# Patient Record
Sex: MALE
[De-identification: names, ages, dates, MRNs, and addresses within clinical notes are randomized per-mention and may not be internally consistent; named-entity substitution may affect disease eponyms.]

---

## 2017-09-13 ENCOUNTER — APPOINTMENT (OUTPATIENT)
Dept: INTERNAL MEDICINE | Facility: CLINIC | Age: 47
End: 2017-09-13
Payer: COMMERCIAL

## 2017-09-13 VITALS
BODY MASS INDEX: 25.92 KG/M2 | HEIGHT: 69 IN | DIASTOLIC BLOOD PRESSURE: 96 MMHG | OXYGEN SATURATION: 99 % | WEIGHT: 175 LBS | SYSTOLIC BLOOD PRESSURE: 141 MMHG | TEMPERATURE: 98.4 F | HEART RATE: 66 BPM

## 2017-09-13 DIAGNOSIS — H04.129 DRY EYE SYNDROME OF UNSPECIFIED LACRIMAL GLAND: ICD-10-CM

## 2017-09-13 DIAGNOSIS — Z78.9 OTHER SPECIFIED HEALTH STATUS: ICD-10-CM

## 2017-09-13 PROCEDURE — 99213 OFFICE O/P EST LOW 20 MIN: CPT

## 2017-09-13 PROCEDURE — 99396 PREV VISIT EST AGE 40-64: CPT

## 2017-10-05 LAB
ALBUMIN SERPL ELPH-MCNC: 4.4 G/DL
ALP BLD-CCNC: 77 U/L
ALT SERPL-CCNC: 23 U/L
ANA SER IF-ACNC: NEGATIVE
ANION GAP SERPL CALC-SCNC: 17 MMOL/L
AST SERPL-CCNC: 27 U/L
B BURGDOR AB SER-IMP: NEGATIVE
B BURGDOR IGM PATRN SER IB-IMP: NEGATIVE
B BURGDOR18/20KD IGM SER QL IB: NORMAL
B BURGDOR18KD IGG SER QL IB: NORMAL
B BURGDOR23KD IGG SER QL IB: NORMAL
B BURGDOR23KD IGM SER QL IB: NORMAL
B BURGDOR28KD AB SER QL IB: NORMAL
B BURGDOR28KD IGG SER QL IB: NORMAL
B BURGDOR30KD AB SER QL IB: NORMAL
B BURGDOR30KD IGG SER QL IB: NORMAL
B BURGDOR31KD IGG SER QL IB: NORMAL
B BURGDOR31KD IGM SER QL IB: NORMAL
B BURGDOR39KD IGG SER QL IB: PRESENT
B BURGDOR39KD IGM SER QL IB: NORMAL
B BURGDOR41KD IGG SER QL IB: PRESENT
B BURGDOR41KD IGM SER QL IB: NORMAL
B BURGDOR45KD AB SER QL IB: NORMAL
B BURGDOR45KD IGG SER QL IB: NORMAL
B BURGDOR58KD AB SER QL IB: NORMAL
B BURGDOR58KD IGG SER QL IB: NORMAL
B BURGDOR66KD IGG SER QL IB: NORMAL
B BURGDOR66KD IGM SER QL IB: NORMAL
B BURGDOR93KD IGG SER QL IB: NORMAL
B BURGDOR93KD IGM SER QL IB: NORMAL
BASOPHILS # BLD AUTO: 0.03 K/UL
BASOPHILS NFR BLD AUTO: 0.6 %
BILIRUB SERPL-MCNC: 0.4 MG/DL
BUN SERPL-MCNC: 12 MG/DL
CALCIUM SERPL-MCNC: 9.5 MG/DL
CHLORIDE SERPL-SCNC: 96 MMOL/L
CHOLEST SERPL-MCNC: 189 MG/DL
CHOLEST/HDLC SERPL: 2.6 RATIO
CO2 SERPL-SCNC: 22 MMOL/L
CREAT SERPL-MCNC: 0.86 MG/DL
CRP SERPL-MCNC: <0.2 MG/DL
ENA SS-A AB SER IA-ACNC: <0.2 AL
ENA SS-B AB SER IA-ACNC: <0.2 AL
EOSINOPHIL # BLD AUTO: 0.04 K/UL
EOSINOPHIL NFR BLD AUTO: 0.9 %
ERYTHROCYTE [SEDIMENTATION RATE] IN BLOOD BY WESTERGREN METHOD: 2 MM/HR
GLUCOSE SERPL-MCNC: 82 MG/DL
HCT VFR BLD CALC: 40.6 %
HDLC SERPL-MCNC: 72 MG/DL
HGB BLD-MCNC: 13.7 G/DL
IMM GRANULOCYTES NFR BLD AUTO: 0 %
LDLC SERPL CALC-MCNC: 106 MG/DL
LYMPHOCYTES # BLD AUTO: 1.64 K/UL
LYMPHOCYTES NFR BLD AUTO: 35.2 %
MAN DIFF?: NORMAL
MCHC RBC-ENTMCNC: 30.7 PG
MCHC RBC-ENTMCNC: 33.7 GM/DL
MCV RBC AUTO: 91 FL
MONOCYTES # BLD AUTO: 0.53 K/UL
MONOCYTES NFR BLD AUTO: 11.4 %
NEUTROPHILS # BLD AUTO: 2.42 K/UL
NEUTROPHILS NFR BLD AUTO: 51.9 %
PLATELET # BLD AUTO: 328 K/UL
POTASSIUM SERPL-SCNC: 4 MMOL/L
PROT SERPL-MCNC: 7.6 G/DL
RBC # BLD: 4.46 M/UL
RBC # FLD: 13 %
RHEUMATOID FACT SER QL: <7 IU/ML
SODIUM SERPL-SCNC: 135 MMOL/L
TRIGL SERPL-MCNC: 54 MG/DL
TSH SERPL-ACNC: 1.9 UIU/ML
WBC # FLD AUTO: 4.66 K/UL

## 2018-03-05 ENCOUNTER — MEDICATION RENEWAL (OUTPATIENT)
Age: 48
End: 2018-03-05

## 2018-06-20 ENCOUNTER — APPOINTMENT (OUTPATIENT)
Dept: INTERNAL MEDICINE | Facility: CLINIC | Age: 48
End: 2018-06-20
Payer: COMMERCIAL

## 2018-06-20 VITALS
WEIGHT: 171 LBS | HEIGHT: 69 IN | TEMPERATURE: 98.2 F | SYSTOLIC BLOOD PRESSURE: 146 MMHG | DIASTOLIC BLOOD PRESSURE: 95 MMHG | OXYGEN SATURATION: 98 % | BODY MASS INDEX: 25.33 KG/M2 | HEART RATE: 67 BPM

## 2018-06-20 DIAGNOSIS — J30.9 ALLERGIC RHINITIS, UNSPECIFIED: ICD-10-CM

## 2018-06-20 PROCEDURE — 99396 PREV VISIT EST AGE 40-64: CPT | Mod: 25

## 2018-06-20 PROCEDURE — 36415 COLL VENOUS BLD VENIPUNCTURE: CPT

## 2018-06-27 LAB
ALBUMIN SERPL ELPH-MCNC: 4.4 G/DL
ALP BLD-CCNC: 69 U/L
ALT SERPL-CCNC: 18 U/L
ANION GAP SERPL CALC-SCNC: 16 MMOL/L
AST SERPL-CCNC: 24 U/L
BASOPHILS # BLD AUTO: 0.03 K/UL
BASOPHILS NFR BLD AUTO: 0.7 %
BILIRUB SERPL-MCNC: 0.6 MG/DL
BUN SERPL-MCNC: 13 MG/DL
CALCIUM SERPL-MCNC: 9.3 MG/DL
CHLORIDE SERPL-SCNC: 95 MMOL/L
CO2 SERPL-SCNC: 23 MMOL/L
CREAT SERPL-MCNC: 0.85 MG/DL
EOSINOPHIL # BLD AUTO: 0.04 K/UL
EOSINOPHIL NFR BLD AUTO: 0.9 %
GLUCOSE SERPL-MCNC: 69 MG/DL
HCT VFR BLD CALC: 43.1 %
HGB BLD-MCNC: 14.2 G/DL
IMM GRANULOCYTES NFR BLD AUTO: 0.2 %
LYMPHOCYTES # BLD AUTO: 1.58 K/UL
LYMPHOCYTES NFR BLD AUTO: 37.1 %
MAN DIFF?: NORMAL
MCHC RBC-ENTMCNC: 30.7 PG
MCHC RBC-ENTMCNC: 32.9 GM/DL
MCV RBC AUTO: 93.1 FL
MONOCYTES # BLD AUTO: 0.35 K/UL
MONOCYTES NFR BLD AUTO: 8.2 %
NEUTROPHILS # BLD AUTO: 2.25 K/UL
NEUTROPHILS NFR BLD AUTO: 52.9 %
PLATELET # BLD AUTO: 316 K/UL
POTASSIUM SERPL-SCNC: 4.1 MMOL/L
PROT SERPL-MCNC: 7.4 G/DL
PSA SERPL-MCNC: 0.34 NG/ML
RBC # BLD: 4.63 M/UL
RBC # FLD: 13.3 %
SODIUM SERPL-SCNC: 134 MMOL/L
TSH SERPL-ACNC: 1.99 UIU/ML
WBC # FLD AUTO: 4.26 K/UL

## 2018-07-09 NOTE — HEALTH RISK ASSESSMENT
[Very Good] : ~his/her~  mood as very good [0] : 2) Feeling down, depressed, or hopeless: Not at all (0) [Patient reported colonoscopy was normal] : Patient reported colonoscopy was normal [] : No [[id number]] : 0 [ColonoscopyDate] : 06/18

## 2018-07-09 NOTE — HISTORY OF PRESENT ILLNESS
[FreeTextEntry1] : Annual [de-identified] : Feels fatigued.  Decreased exercising due to work.  Feels like lost 5-10 lbs.  No nausea, vomiting, diarrhea, and constipation. No chest pain or shortness of breath.\par

## 2018-08-30 ENCOUNTER — RX RENEWAL (OUTPATIENT)
Age: 48
End: 2018-08-30

## 2019-02-14 ENCOUNTER — TRANSCRIPTION ENCOUNTER (OUTPATIENT)
Age: 49
End: 2019-02-14

## 2019-04-08 ENCOUNTER — RX RENEWAL (OUTPATIENT)
Age: 49
End: 2019-04-08

## 2019-08-13 ENCOUNTER — NON-APPOINTMENT (OUTPATIENT)
Age: 49
End: 2019-08-13

## 2019-08-13 ENCOUNTER — APPOINTMENT (OUTPATIENT)
Dept: INTERNAL MEDICINE | Facility: CLINIC | Age: 49
End: 2019-08-13
Payer: COMMERCIAL

## 2019-08-13 VITALS
HEIGHT: 70 IN | TEMPERATURE: 98 F | DIASTOLIC BLOOD PRESSURE: 83 MMHG | SYSTOLIC BLOOD PRESSURE: 135 MMHG | HEART RATE: 60 BPM | WEIGHT: 167 LBS | OXYGEN SATURATION: 98 % | RESPIRATION RATE: 14 BRPM | BODY MASS INDEX: 23.91 KG/M2

## 2019-08-13 DIAGNOSIS — M25.551 PAIN IN RIGHT HIP: ICD-10-CM

## 2019-08-13 DIAGNOSIS — R63.4 ABNORMAL WEIGHT LOSS: ICD-10-CM

## 2019-08-13 DIAGNOSIS — M25.552 PAIN IN RIGHT HIP: ICD-10-CM

## 2019-08-13 DIAGNOSIS — R00.2 PALPITATIONS: ICD-10-CM

## 2019-08-13 PROCEDURE — 36415 COLL VENOUS BLD VENIPUNCTURE: CPT

## 2019-08-13 PROCEDURE — 93000 ELECTROCARDIOGRAM COMPLETE: CPT

## 2019-08-13 PROCEDURE — 99396 PREV VISIT EST AGE 40-64: CPT | Mod: 25

## 2019-08-13 NOTE — PHYSICAL EXAM
[No Acute Distress] : no acute distress [Well Nourished] : well nourished [Well Developed] : well developed [Well-Appearing] : well-appearing [Normal Sclera/Conjunctiva] : normal sclera/conjunctiva [PERRL] : pupils equal round and reactive to light [EOMI] : extraocular movements intact [Normal Outer Ear/Nose] : the outer ears and nose were normal in appearance [Normal Oropharynx] : the oropharynx was normal [No JVD] : no jugular venous distention [No Lymphadenopathy] : no lymphadenopathy [Supple] : supple [Thyroid Normal, No Nodules] : the thyroid was normal and there were no nodules present [No Respiratory Distress] : no respiratory distress  [No Accessory Muscle Use] : no accessory muscle use [Clear to Auscultation] : lungs were clear to auscultation bilaterally [Normal Rate] : normal rate  [Regular Rhythm] : with a regular rhythm [Normal S1, S2] : normal S1 and S2 [No Murmur] : no murmur heard [No Carotid Bruits] : no carotid bruits [No Abdominal Bruit] : a ~M bruit was not heard ~T in the abdomen [No Varicosities] : no varicosities [Pedal Pulses Present] : the pedal pulses are present [No Edema] : there was no peripheral edema [No Palpable Aorta] : no palpable aorta [No Extremity Clubbing/Cyanosis] : no extremity clubbing/cyanosis [Soft] : abdomen soft [Non Tender] : non-tender [No Masses] : no abdominal mass palpated [Non-distended] : non-distended [No HSM] : no HSM [Normal Posterior Cervical Nodes] : no posterior cervical lymphadenopathy [Normal Bowel Sounds] : normal bowel sounds [Normal Anterior Cervical Nodes] : no anterior cervical lymphadenopathy [No Spinal Tenderness] : no spinal tenderness [No CVA Tenderness] : no CVA  tenderness [No Joint Swelling] : no joint swelling [No Rash] : no rash [Grossly Normal Strength/Tone] : grossly normal strength/tone [Coordination Grossly Intact] : coordination grossly intact [No Focal Deficits] : no focal deficits [Deep Tendon Reflexes (DTR)] : deep tendon reflexes were 2+ and symmetric [Normal Gait] : normal gait [Normal Affect] : the affect was normal [Normal Insight/Judgement] : insight and judgment were intact

## 2019-08-13 NOTE — HISTORY OF PRESENT ILLNESS
[de-identified] : Very active.  Just hiked 150 miles.  Lost about 4 lbs this year.  No nausea, vomiting, diarrhea, and constipation. No chest pain or shortness of breath.\par

## 2019-08-13 NOTE — REVIEW OF SYSTEMS
[Recent Change In Weight] : ~T recent weight change [Negative] : Heme/Lymph [Night Sweats] : no night sweats [Fever] : no fever

## 2019-08-13 NOTE — HEALTH RISK ASSESSMENT
[Very Good] : ~his/her~  mood as very good [0] : 1) Little interest or pleasure doing things: Not at all (0) [[id number]] : 0 [None] : None [Employed] : employed [Alone] : lives alone [Significant Other] : lives with significant other

## 2019-08-27 LAB
ALBUMIN SERPL ELPH-MCNC: 4.7 G/DL
ALP BLD-CCNC: 74 U/L
ALT SERPL-CCNC: 21 U/L
ANA SER IF-ACNC: NEGATIVE
ANION GAP SERPL CALC-SCNC: 12 MMOL/L
AST SERPL-CCNC: 24 U/L
BASOPHILS # BLD AUTO: 0.03 K/UL
BASOPHILS NFR BLD AUTO: 0.7 %
BILIRUB SERPL-MCNC: 0.6 MG/DL
BUN SERPL-MCNC: 10 MG/DL
CALCIUM SERPL-MCNC: 9.4 MG/DL
CHLORIDE SERPL-SCNC: 96 MMOL/L
CHOLEST SERPL-MCNC: 216 MG/DL
CHOLEST/HDLC SERPL: 2.7 RATIO
CO2 SERPL-SCNC: 25 MMOL/L
CREAT SERPL-MCNC: 0.72 MG/DL
EOSINOPHIL # BLD AUTO: 0.06 K/UL
EOSINOPHIL NFR BLD AUTO: 1.3 %
ERYTHROCYTE [SEDIMENTATION RATE] IN BLOOD BY WESTERGREN METHOD: 2 MM/HR
GLUCOSE SERPL-MCNC: 97 MG/DL
HCT VFR BLD CALC: 44.2 %
HDLC SERPL-MCNC: 80 MG/DL
HGB BLD-MCNC: 14.2 G/DL
IMM GRANULOCYTES NFR BLD AUTO: 0.2 %
LDLC SERPL CALC-MCNC: 124 MG/DL
LYMPHOCYTES # BLD AUTO: 0.99 K/UL
LYMPHOCYTES NFR BLD AUTO: 21.5 %
MAN DIFF?: NORMAL
MCHC RBC-ENTMCNC: 31.4 PG
MCHC RBC-ENTMCNC: 32.1 GM/DL
MCV RBC AUTO: 97.8 FL
MONOCYTES # BLD AUTO: 0.42 K/UL
MONOCYTES NFR BLD AUTO: 9.1 %
NEUTROPHILS # BLD AUTO: 3.1 K/UL
NEUTROPHILS NFR BLD AUTO: 67.2 %
PLATELET # BLD AUTO: 323 K/UL
POTASSIUM SERPL-SCNC: 4.3 MMOL/L
PROT SERPL-MCNC: 6.9 G/DL
PSA SERPL-MCNC: 0.34 NG/ML
RBC # BLD: 4.52 M/UL
RBC # FLD: 13.5 %
RHEUMATOID FACT SER QL: <10 IU/ML
SODIUM SERPL-SCNC: 133 MMOL/L
TRIGL SERPL-MCNC: 60 MG/DL
TSH SERPL-ACNC: 1.71 UIU/ML
WBC # FLD AUTO: 4.61 K/UL

## 2020-03-31 ENCOUNTER — RX RENEWAL (OUTPATIENT)
Age: 50
End: 2020-03-31

## 2020-06-25 ENCOUNTER — RX RENEWAL (OUTPATIENT)
Age: 50
End: 2020-06-25

## 2020-08-17 ENCOUNTER — APPOINTMENT (OUTPATIENT)
Dept: INTERNAL MEDICINE | Facility: CLINIC | Age: 50
End: 2020-08-17
Payer: COMMERCIAL

## 2020-08-17 VITALS
HEIGHT: 69 IN | SYSTOLIC BLOOD PRESSURE: 127 MMHG | HEART RATE: 65 BPM | BODY MASS INDEX: 25.18 KG/M2 | DIASTOLIC BLOOD PRESSURE: 86 MMHG | OXYGEN SATURATION: 99 % | WEIGHT: 170 LBS | TEMPERATURE: 98.5 F

## 2020-08-17 DIAGNOSIS — L30.9 DERMATITIS, UNSPECIFIED: ICD-10-CM

## 2020-08-17 DIAGNOSIS — Z11.59 ENCOUNTER FOR SCREENING FOR OTHER VIRAL DISEASES: ICD-10-CM

## 2020-08-17 DIAGNOSIS — R39.198 OTHER DIFFICULTIES WITH MICTURITION: ICD-10-CM

## 2020-08-17 PROCEDURE — 36415 COLL VENOUS BLD VENIPUNCTURE: CPT

## 2020-08-17 PROCEDURE — 99396 PREV VISIT EST AGE 40-64: CPT | Mod: 25

## 2020-08-17 NOTE — PHYSICAL EXAM
[No Acute Distress] : no acute distress [Well Nourished] : well nourished [Well Developed] : well developed [PERRL] : pupils equal round and reactive to light [Well-Appearing] : well-appearing [Normal Sclera/Conjunctiva] : normal sclera/conjunctiva [EOMI] : extraocular movements intact [Normal Outer Ear/Nose] : the outer ears and nose were normal in appearance [Normal Oropharynx] : the oropharynx was normal [No JVD] : no jugular venous distention [No Lymphadenopathy] : no lymphadenopathy [Supple] : supple [Thyroid Normal, No Nodules] : the thyroid was normal and there were no nodules present [No Respiratory Distress] : no respiratory distress  [Clear to Auscultation] : lungs were clear to auscultation bilaterally [No Accessory Muscle Use] : no accessory muscle use [Regular Rhythm] : with a regular rhythm [Normal S1, S2] : normal S1 and S2 [Normal Rate] : normal rate  [No Murmur] : no murmur heard [No Abdominal Bruit] : a ~M bruit was not heard ~T in the abdomen [No Carotid Bruits] : no carotid bruits [No Varicosities] : no varicosities [Pedal Pulses Present] : the pedal pulses are present [No Edema] : there was no peripheral edema [No Palpable Aorta] : no palpable aorta [No Extremity Clubbing/Cyanosis] : no extremity clubbing/cyanosis [Soft] : abdomen soft [Non Tender] : non-tender [Non-distended] : non-distended [No Masses] : no abdominal mass palpated [No HSM] : no HSM [Normal Bowel Sounds] : normal bowel sounds [Normal Posterior Cervical Nodes] : no posterior cervical lymphadenopathy [Normal Anterior Cervical Nodes] : no anterior cervical lymphadenopathy [No CVA Tenderness] : no CVA  tenderness [Grossly Normal Strength/Tone] : grossly normal strength/tone [No Joint Swelling] : no joint swelling [No Spinal Tenderness] : no spinal tenderness [Coordination Grossly Intact] : coordination grossly intact [No Rash] : no rash [No Focal Deficits] : no focal deficits [Normal Affect] : the affect was normal [Normal Gait] : normal gait [Normal Insight/Judgement] : insight and judgment were intact

## 2020-08-17 NOTE — HEALTH RISK ASSESSMENT
[Very Good] : ~his/her~  mood as very good [0] : 2) Feeling down, depressed, or hopeless: Not at all (0) [None] : None [Significant Other] : lives with significant other [] : No [[id number]] : 0 [Change in mental status noted] : No change in mental status noted

## 2020-08-17 NOTE — HISTORY OF PRESENT ILLNESS
[de-identified] : No nausea, vomiting, diarrhea, and constipation. No chest pain or shortness of breath.\par R knee discomfort after running a few days in a row.\par

## 2020-08-24 LAB
ALBUMIN SERPL ELPH-MCNC: 4.9 G/DL
ALP BLD-CCNC: 99 U/L
ALT SERPL-CCNC: 18 U/L
ANION GAP SERPL CALC-SCNC: 13 MMOL/L
AST SERPL-CCNC: 20 U/L
BASOPHILS # BLD AUTO: 0.02 K/UL
BASOPHILS NFR BLD AUTO: 0.3 %
BILIRUB SERPL-MCNC: 0.4 MG/DL
BUN SERPL-MCNC: 8 MG/DL
CALCIUM SERPL-MCNC: 9.7 MG/DL
CHLORIDE SERPL-SCNC: 95 MMOL/L
CHOLEST SERPL-MCNC: 202 MG/DL
CHOLEST/HDLC SERPL: 2.9 RATIO
CO2 SERPL-SCNC: 27 MMOL/L
CREAT SERPL-MCNC: 0.87 MG/DL
EOSINOPHIL # BLD AUTO: 0.04 K/UL
EOSINOPHIL NFR BLD AUTO: 0.6 %
GLUCOSE SERPL-MCNC: 95 MG/DL
HCT VFR BLD CALC: 45.7 %
HDLC SERPL-MCNC: 69 MG/DL
HGB BLD-MCNC: 14.9 G/DL
IMM GRANULOCYTES NFR BLD AUTO: 0.2 %
LDLC SERPL CALC-MCNC: 122 MG/DL
LYMPHOCYTES # BLD AUTO: 1.18 K/UL
LYMPHOCYTES NFR BLD AUTO: 18.5 %
MAN DIFF?: NORMAL
MCHC RBC-ENTMCNC: 30.7 PG
MCHC RBC-ENTMCNC: 32.6 GM/DL
MCV RBC AUTO: 94 FL
MONOCYTES # BLD AUTO: 0.72 K/UL
MONOCYTES NFR BLD AUTO: 11.3 %
NEUTROPHILS # BLD AUTO: 4.42 K/UL
NEUTROPHILS NFR BLD AUTO: 69.1 %
PLATELET # BLD AUTO: 319 K/UL
POTASSIUM SERPL-SCNC: 4.9 MMOL/L
PROT SERPL-MCNC: 7.4 G/DL
PSA SERPL-MCNC: 0.39 NG/ML
RBC # BLD: 4.86 M/UL
RBC # FLD: 12.5 %
SARS-COV-2 IGG SERPL IA-ACNC: 0.08 INDEX
SARS-COV-2 IGG SERPL QL IA: NEGATIVE
SODIUM SERPL-SCNC: 134 MMOL/L
TRIGL SERPL-MCNC: 56 MG/DL
WBC # FLD AUTO: 6.39 K/UL

## 2021-01-08 ENCOUNTER — RX RENEWAL (OUTPATIENT)
Age: 51
End: 2021-01-08

## 2021-02-02 ENCOUNTER — RX CHANGE (OUTPATIENT)
Age: 51
End: 2021-02-02

## 2021-06-14 ENCOUNTER — APPOINTMENT (OUTPATIENT)
Dept: INTERNAL MEDICINE | Facility: CLINIC | Age: 51
End: 2021-06-14
Payer: COMMERCIAL

## 2021-06-14 VITALS
HEIGHT: 68 IN | BODY MASS INDEX: 24.71 KG/M2 | DIASTOLIC BLOOD PRESSURE: 80 MMHG | SYSTOLIC BLOOD PRESSURE: 128 MMHG | TEMPERATURE: 97.8 F | OXYGEN SATURATION: 99 % | HEART RATE: 65 BPM | WEIGHT: 163 LBS

## 2021-06-14 DIAGNOSIS — M25.569 PAIN IN UNSPECIFIED KNEE: ICD-10-CM

## 2021-06-14 PROCEDURE — 99213 OFFICE O/P EST LOW 20 MIN: CPT

## 2021-06-15 PROBLEM — M25.569 KNEE PAIN: Status: ACTIVE | Noted: 2021-06-15

## 2021-06-15 NOTE — HISTORY OF PRESENT ILLNESS
[FreeTextEntry1] : knee pain [de-identified] : Going on a hike 100 miles in 1 week.  Having pain Medial right knee.  Did some PT with improvement.  Has decreased in swelling since also.  Has been stretching and rolling.

## 2021-06-15 NOTE — PHYSICAL EXAM
[No Acute Distress] : no acute distress [Well Nourished] : well nourished [Normal Sclera/Conjunctiva] : normal sclera/conjunctiva [EOMI] : extraocular movements intact [No Respiratory Distress] : no respiratory distress  [Clear to Auscultation] : lungs were clear to auscultation bilaterally [Regular Rhythm] : with a regular rhythm [Normal S1, S2] : normal S1 and S2 [Soft] : abdomen soft [Non Tender] : non-tender [de-identified] : FROM Knee, no effusion, non tender joint line

## 2021-06-15 NOTE — REVIEW OF SYSTEMS
[Fever] : no fever [Night Sweats] : no night sweats [Chest Pain] : no chest pain [Palpitations] : no palpitations [Shortness Of Breath] : no shortness of breath [Cough] : no cough

## 2021-08-24 ENCOUNTER — APPOINTMENT (OUTPATIENT)
Dept: INTERNAL MEDICINE | Facility: CLINIC | Age: 51
End: 2021-08-24
Payer: COMMERCIAL

## 2021-08-24 VITALS
OXYGEN SATURATION: 98 % | TEMPERATURE: 97.4 F | SYSTOLIC BLOOD PRESSURE: 128 MMHG | HEART RATE: 64 BPM | WEIGHT: 166 LBS | BODY MASS INDEX: 25.16 KG/M2 | HEIGHT: 68 IN | DIASTOLIC BLOOD PRESSURE: 83 MMHG

## 2021-08-24 DIAGNOSIS — M25.542 PAIN IN JOINTS OF RIGHT HAND: ICD-10-CM

## 2021-08-24 DIAGNOSIS — M25.541 PAIN IN JOINTS OF RIGHT HAND: ICD-10-CM

## 2021-08-24 PROCEDURE — 99396 PREV VISIT EST AGE 40-64: CPT | Mod: 25

## 2021-08-24 NOTE — HEALTH RISK ASSESSMENT
[Very Good] : ~his/her~  mood as very good [0] : 2) Feeling down, depressed, or hopeless: Not at all (0) [PHQ-2 Negative - No further assessment needed] : PHQ-2 Negative - No further assessment needed [] : No [[id number]] : 0

## 2021-08-24 NOTE — HISTORY OF PRESENT ILLNESS
[de-identified] : No nausea, vomiting, diarrhea, and constipation. No chest pain or shortness of breath.\par Joined [employer].  Weight 166 with more toned muscle.  Eating healthy.  Still with achy hands in the am

## 2021-08-27 LAB
ALBUMIN SERPL ELPH-MCNC: 4.7 G/DL
ALP BLD-CCNC: 91 U/L
ALT SERPL-CCNC: 16 U/L
ANION GAP SERPL CALC-SCNC: 12 MMOL/L
APPEARANCE: CLEAR
AST SERPL-CCNC: 20 U/L
BASOPHILS # BLD AUTO: 0.04 K/UL
BASOPHILS NFR BLD AUTO: 0.9 %
BILIRUB SERPL-MCNC: 0.5 MG/DL
BILIRUBIN URINE: NEGATIVE
BLOOD URINE: NEGATIVE
BUN SERPL-MCNC: 13 MG/DL
CALCIUM SERPL-MCNC: 9.7 MG/DL
CHLORIDE SERPL-SCNC: 96 MMOL/L
CHOLEST SERPL-MCNC: 199 MG/DL
CK SERPL-CCNC: 227 U/L
CO2 SERPL-SCNC: 25 MMOL/L
COLOR: YELLOW
CREAT SERPL-MCNC: 0.8 MG/DL
EOSINOPHIL # BLD AUTO: 0.04 K/UL
EOSINOPHIL NFR BLD AUTO: 0.9 %
ERYTHROCYTE [SEDIMENTATION RATE] IN BLOOD BY WESTERGREN METHOD: 4 MM/HR
GLUCOSE QUALITATIVE U: NEGATIVE
GLUCOSE SERPL-MCNC: 90 MG/DL
HCT VFR BLD CALC: 44.7 %
HDLC SERPL-MCNC: 74 MG/DL
HGB BLD-MCNC: 14.8 G/DL
IMM GRANULOCYTES NFR BLD AUTO: 0.2 %
KETONES URINE: NEGATIVE
LDLC SERPL CALC-MCNC: 115 MG/DL
LEUKOCYTE ESTERASE URINE: NEGATIVE
LYMPHOCYTES # BLD AUTO: 1.26 K/UL
LYMPHOCYTES NFR BLD AUTO: 29.4 %
MAN DIFF?: NORMAL
MCHC RBC-ENTMCNC: 30.9 PG
MCHC RBC-ENTMCNC: 33.1 GM/DL
MCV RBC AUTO: 93.3 FL
MONOCYTES # BLD AUTO: 0.4 K/UL
MONOCYTES NFR BLD AUTO: 9.3 %
NEUTROPHILS # BLD AUTO: 2.53 K/UL
NEUTROPHILS NFR BLD AUTO: 59.3 %
NITRITE URINE: NEGATIVE
NONHDLC SERPL-MCNC: 125 MG/DL
PH URINE: 6.5
PLATELET # BLD AUTO: 328 K/UL
POTASSIUM SERPL-SCNC: 4.8 MMOL/L
PROT SERPL-MCNC: 7.2 G/DL
PROTEIN URINE: NEGATIVE
PSA SERPL-MCNC: 0.35 NG/ML
RBC # BLD: 4.79 M/UL
RBC # FLD: 12.9 %
SODIUM SERPL-SCNC: 133 MMOL/L
SPECIFIC GRAVITY URINE: 1.02
TRIGL SERPL-MCNC: 54 MG/DL
TSH SERPL-ACNC: 2.34 UIU/ML
UROBILINOGEN URINE: NORMAL
WBC # FLD AUTO: 4.28 K/UL

## 2021-11-01 ENCOUNTER — RX RENEWAL (OUTPATIENT)
Age: 51
End: 2021-11-01

## 2022-04-11 PROBLEM — Z11.59 SCREENING FOR VIRAL DISEASE: Status: ACTIVE | Noted: 2020-08-17

## 2022-05-11 ENCOUNTER — APPOINTMENT (OUTPATIENT)
Dept: ORTHOPEDIC SURGERY | Facility: CLINIC | Age: 52
End: 2022-05-11
Payer: COMMERCIAL

## 2022-05-11 VITALS — HEIGHT: 70 IN | WEIGHT: 170 LBS | BODY MASS INDEX: 24.34 KG/M2

## 2022-05-11 DIAGNOSIS — M25.561 PAIN IN RIGHT KNEE: ICD-10-CM

## 2022-05-11 PROCEDURE — 20611 DRAIN/INJ JOINT/BURSA W/US: CPT | Mod: RT

## 2022-05-11 PROCEDURE — 73562 X-RAY EXAM OF KNEE 3: CPT | Mod: RT

## 2022-05-11 PROCEDURE — 99204 OFFICE O/P NEW MOD 45 MIN: CPT | Mod: 25

## 2022-05-11 NOTE — ASSESSMENT
[FreeTextEntry1] : Discussed at length with patient cyst consistent with a para meniscal cyst and given symptoms I would recommend MRI evaluation to further evaluate the mass as well as for potential meniscus pathology.  Patient is agreeable and MRI will be ordered

## 2022-05-11 NOTE — PHYSICAL EXAM
[de-identified] : Right knee\par \par Constitutional: \par The patient is healthy-appearing and in no apparent distress. \par \par Gait:\par The patient ambulates with a normal gait and no limp.\par \par Cardiovascular System: \par The capillary refill is less than 2 seconds. \par \par Skin: \par There are is a moderate sized ( 2 x 2 cm ) direct medial mass ( soft, mobile, mildly tender ) c/w ganglion cyst.\par Right Knee:\par  \par Bony Palpation: \par There is TENDERNESS of the medial joint line. \par There is no tenderness of the lateral joint line.\par There is no tenderness of the medial femoral chondyle.\par There is no tenderness of the lateral femoral chondyle.\par There is no tenderness of the tibial tubercle.\par There is no tenderness of the superior patella.\par There is no tenderness of the inferior patella.\par There is no tenderness of the medial patellar facet.\par There is no tenderness of the lateral patellar facet.\par \par Soft Tissue Palpation: \par There is no tenderness of the medial retinaculum.\par There is no tenderness of the lateral retinaculum.\par There is no tenderness of the quadriceps tendon.\par There is no tenderness of the patella tendon.\par There is no tenderness of the ITB.\par There is no tenderness of the pes anserine.\par \par Active Range of Motion: \par The range of motion at the knee actively and passively is full. \par \par Special Tests: \par There is a POSITIVE Apley.\par There is a POSITIVE Steinmanns. \par There is a negative Lachman and Anterior Drawer.\par There is a negative Posterior Drawer.  \par There is no varus or valgus laxity.\par \par Strength: \par There is 5/5 hip flexion and 5/5 knee flexion and extension.  \par \par Psychiatric: \par The patient demonstrates a normal mood and affect and is active and alert\par  [de-identified] : Given patient's reported history and physical examination, x-ray evaluation ( as listed below ) was ordered and performed to aid in diagnosis and treatment of the patient.\par X-ray right knee.  There is minimal medial patellofemoral spurring as well as minimal medial joint space narrowing\par

## 2022-05-11 NOTE — PROCEDURE
[de-identified] : After discussion with the patient in regards to her right medial knee mass the patient elects for ultrasound evaluation with potential aspiration.  Under sterile conditions the area over the cyst was injected with 1% lidocaine without epinephrine and using an ultrasound evaluation a 3 x 2 cm x 1 cm hypoechoic mass was identified consistent with a para meniscal cyst.  Sterile aspiration was performed with mild return of cystic fluid consistent with a ganglion cyst and decompression of the cyst manually confirmed on MRI and visually. \par

## 2022-05-11 NOTE — HISTORY OF PRESENT ILLNESS
[de-identified] : Location: Right knee\par Duration: > 2 years - worse in the last week\par Context: atraumatic\par Quality: dull, sharp\par Aggravating factors: walking, stairs\par Associated symptoms: swelling, clicking\par Conservative treatment: Ibuprofen, PT\par Prior studies: N/A

## 2022-05-16 ENCOUNTER — TRANSCRIPTION ENCOUNTER (OUTPATIENT)
Age: 52
End: 2022-05-16

## 2022-06-14 ENCOUNTER — NON-APPOINTMENT (OUTPATIENT)
Age: 52
End: 2022-06-14

## 2022-08-31 ENCOUNTER — NON-APPOINTMENT (OUTPATIENT)
Age: 52
End: 2022-08-31

## 2022-08-31 ENCOUNTER — APPOINTMENT (OUTPATIENT)
Dept: INTERNAL MEDICINE | Facility: CLINIC | Age: 52
End: 2022-08-31

## 2022-08-31 VITALS
WEIGHT: 175 LBS | SYSTOLIC BLOOD PRESSURE: 145 MMHG | HEART RATE: 66 BPM | BODY MASS INDEX: 25.05 KG/M2 | DIASTOLIC BLOOD PRESSURE: 90 MMHG | HEIGHT: 70 IN | RESPIRATION RATE: 16 BRPM

## 2022-08-31 DIAGNOSIS — R35.1 NOCTURIA: ICD-10-CM

## 2022-08-31 DIAGNOSIS — M25.50 PAIN IN UNSPECIFIED JOINT: ICD-10-CM

## 2022-08-31 PROCEDURE — 36415 COLL VENOUS BLD VENIPUNCTURE: CPT

## 2022-08-31 PROCEDURE — 99396 PREV VISIT EST AGE 40-64: CPT | Mod: 25

## 2022-08-31 RX ORDER — NABUMETONE 500 MG/1
500 TABLET, FILM COATED ORAL
Qty: 30 | Refills: 2 | Status: DISCONTINUED | COMMUNITY
Start: 2022-05-11 | End: 2022-08-31

## 2022-08-31 RX ORDER — TRIAMCINOLONE ACETONIDE 5 MG/G
0.5 CREAM TOPICAL 3 TIMES DAILY
Qty: 30 | Refills: 1 | Status: DISCONTINUED | COMMUNITY
Start: 2020-08-17 | End: 2022-08-31

## 2022-08-31 NOTE — PHYSICAL EXAM
[No Acute Distress] : no acute distress [Well Nourished] : well nourished [Normal Sclera/Conjunctiva] : normal sclera/conjunctiva [EOMI] : extraocular movements intact [Normal Outer Ear/Nose] : the outer ears and nose were normal in appearance [Normal TMs] : both tympanic membranes were normal [No JVD] : no jugular venous distention [Supple] : supple [No Accessory Muscle Use] : no accessory muscle use [Clear to Auscultation] : lungs were clear to auscultation bilaterally [Regular Rhythm] : with a regular rhythm [Normal S1, S2] : normal S1 and S2 [No Carotid Bruits] : no carotid bruits [No Edema] : there was no peripheral edema [Soft] : abdomen soft [Non Tender] : non-tender [Coordination Grossly Intact] : coordination grossly intact [Normal Gait] : normal gait [Speech Grossly Normal] : speech grossly normal [Normal Mood] : the mood was normal

## 2022-09-01 NOTE — HISTORY OF PRESENT ILLNESS
[de-identified] : Exercising mostly cardio.  No nausea, vomiting, diarrhea, and constipation. No chest pain or shortness of breath.\par Eating heathily.

## 2022-09-01 NOTE — HEALTH RISK ASSESSMENT
[Very Good] : ~his/her~  mood as very good [0] : 2) Feeling down, depressed, or hopeless: Not at all (0) [PHQ-2 Negative - No further assessment needed] : PHQ-2 Negative - No further assessment needed [None] : None [With Significant Other] : lives with significant other [] :  [[id number]] : 0

## 2022-09-01 NOTE — REVIEW OF SYSTEMS
[Fever] : no fever [Night Sweats] : no night sweats [Discharge] : no discharge [Vision Problems] : no vision problems [Earache] : no earache [Hearing Loss] : no hearing loss [Chest Pain] : no chest pain [Palpitations] : no palpitations [Shortness Of Breath] : no shortness of breath [Cough] : no cough [Nausea] : no nausea [Vomiting] : no vomiting [Dysuria] : no dysuria [Hematuria] : no hematuria [Joint Pain] : no joint pain [Back Pain] : no back pain [Easy Bleeding] : no easy bleeding [Easy Bruising] : no easy bruising

## 2022-09-08 LAB
ALBUMIN SERPL ELPH-MCNC: 4.7 G/DL
ALP BLD-CCNC: 94 U/L
ALT SERPL-CCNC: 17 U/L
ANION GAP SERPL CALC-SCNC: 14 MMOL/L
AST SERPL-CCNC: 21 U/L
BASOPHILS # BLD AUTO: 0.03 K/UL
BASOPHILS NFR BLD AUTO: 0.5 %
BILIRUB SERPL-MCNC: 0.6 MG/DL
BUN SERPL-MCNC: 11 MG/DL
CALCIUM SERPL-MCNC: 9.5 MG/DL
CHLORIDE SERPL-SCNC: 93 MMOL/L
CHOLEST SERPL-MCNC: 203 MG/DL
CK SERPL-CCNC: 147 U/L
CO2 SERPL-SCNC: 25 MMOL/L
CREAT SERPL-MCNC: 0.76 MG/DL
EGFR: 107 ML/MIN/1.73M2
EOSINOPHIL # BLD AUTO: 0.05 K/UL
EOSINOPHIL NFR BLD AUTO: 0.9 %
GLUCOSE SERPL-MCNC: 80 MG/DL
HCT VFR BLD CALC: 43.3 %
HDLC SERPL-MCNC: 77 MG/DL
HGB BLD-MCNC: 14.6 G/DL
IMM GRANULOCYTES NFR BLD AUTO: 0.4 %
LDLC SERPL CALC-MCNC: 112 MG/DL
LYMPHOCYTES # BLD AUTO: 1.5 K/UL
LYMPHOCYTES NFR BLD AUTO: 26.7 %
MAN DIFF?: NORMAL
MCHC RBC-ENTMCNC: 31 PG
MCHC RBC-ENTMCNC: 33.7 GM/DL
MCV RBC AUTO: 91.9 FL
MONOCYTES # BLD AUTO: 0.48 K/UL
MONOCYTES NFR BLD AUTO: 8.6 %
NEUTROPHILS # BLD AUTO: 3.53 K/UL
NEUTROPHILS NFR BLD AUTO: 62.9 %
NONHDLC SERPL-MCNC: 126 MG/DL
PLATELET # BLD AUTO: 312 K/UL
POTASSIUM SERPL-SCNC: 4 MMOL/L
PROT SERPL-MCNC: 7.1 G/DL
PSA SERPL-MCNC: 0.31 NG/ML
RBC # BLD: 4.71 M/UL
RBC # FLD: 12.8 %
SODIUM SERPL-SCNC: 131 MMOL/L
TRIGL SERPL-MCNC: 72 MG/DL
TSH SERPL-ACNC: 1.81 UIU/ML
WBC # FLD AUTO: 5.61 K/UL

## 2022-10-28 ENCOUNTER — RX RENEWAL (OUTPATIENT)
Age: 52
End: 2022-10-28

## 2023-01-19 ENCOUNTER — APPOINTMENT (OUTPATIENT)
Dept: ORTHOPEDIC SURGERY | Facility: CLINIC | Age: 53
End: 2023-01-19
Payer: COMMERCIAL

## 2023-01-19 DIAGNOSIS — M23.90 UNSPECIFIED INTERNAL DERANGEMENT OF UNSPECIFIED KNEE: ICD-10-CM

## 2023-01-19 PROCEDURE — 99213 OFFICE O/P EST LOW 20 MIN: CPT

## 2023-01-19 NOTE — HISTORY OF PRESENT ILLNESS
[de-identified] : Patient is an established patient last seen back in May with persistent right medial knee pain.  He states mild temporary improvement but now worse are last several months

## 2023-01-19 NOTE — PHYSICAL EXAM
[de-identified] : Right knee\par \par Constitutional: \par The patient is healthy-appearing and in no apparent distress. \par \par Gait:\par The patient ambulates with a normal gait and no limp.\par \par Cardiovascular System: \par The capillary refill is less than 2 seconds. \par \par Skin: \par There are is a mild direct medial mass ( soft, mobile, mildly tender ) c/w ganglion cyst.\par \par Right Knee:\par  \par Bony Palpation: \par There is TENDERNESS of the medial joint line. \par There is no tenderness of the lateral joint line.\par There is no tenderness of the medial femoral chondyle.\par There is no tenderness of the lateral femoral chondyle.\par There is no tenderness of the tibial tubercle.\par There is no tenderness of the superior patella.\par There is no tenderness of the inferior patella.\par There is no tenderness of the medial patellar facet.\par There is no tenderness of the lateral patellar facet.\par \par Soft Tissue Palpation: \par There is no tenderness of the medial retinaculum.\par There is no tenderness of the lateral retinaculum.\par There is no tenderness of the quadriceps tendon.\par There is no tenderness of the patella tendon.\par There is no tenderness of the ITB.\par There is no tenderness of the pes anserine.\par \par Active Range of Motion: \par The range of motion at the knee actively and passively is full. \par \par Special Tests: \par There is a POSITIVE Apley.\par There is a POSITIVE Steinmanns. \par There is a negative Lachman and Anterior Drawer.\par There is a negative Posterior Drawer.  \par There is no varus or valgus laxity.\par \par Strength: \par There is 5/5 hip flexion and 5/5 knee flexion and extension.  \par \par Psychiatric: \par The patient demonstrates a normal mood and affect and is active and alert\par

## 2023-02-13 ENCOUNTER — APPOINTMENT (OUTPATIENT)
Dept: INTERNAL MEDICINE | Facility: CLINIC | Age: 53
End: 2023-02-13

## 2023-02-15 ENCOUNTER — APPOINTMENT (OUTPATIENT)
Dept: INTERNAL MEDICINE | Facility: CLINIC | Age: 53
End: 2023-02-15
Payer: COMMERCIAL

## 2023-02-15 ENCOUNTER — NON-APPOINTMENT (OUTPATIENT)
Age: 53
End: 2023-02-15

## 2023-02-15 VITALS
HEIGHT: 70 IN | TEMPERATURE: 96.6 F | WEIGHT: 170 LBS | BODY MASS INDEX: 24.34 KG/M2 | SYSTOLIC BLOOD PRESSURE: 154 MMHG | RESPIRATION RATE: 16 BRPM | HEART RATE: 70 BPM | OXYGEN SATURATION: 99 % | DIASTOLIC BLOOD PRESSURE: 86 MMHG

## 2023-02-15 DIAGNOSIS — R53.81 OTHER MALAISE: ICD-10-CM

## 2023-02-15 DIAGNOSIS — Z01.818 ENCOUNTER FOR OTHER PREPROCEDURAL EXAMINATION: ICD-10-CM

## 2023-02-15 DIAGNOSIS — R53.83 OTHER MALAISE: ICD-10-CM

## 2023-02-15 PROCEDURE — 93000 ELECTROCARDIOGRAM COMPLETE: CPT

## 2023-02-15 PROCEDURE — 99214 OFFICE O/P EST MOD 30 MIN: CPT | Mod: 25

## 2023-02-15 NOTE — PHYSICAL EXAM
[No Acute Distress] : no acute distress [Well Nourished] : well nourished [Normal Oropharynx] : the oropharynx was normal [Normal TMs] : both tympanic membranes were normal [No JVD] : no jugular venous distention [Supple] : supple [No Accessory Muscle Use] : no accessory muscle use [Clear to Auscultation] : lungs were clear to auscultation bilaterally [Normal Rate] : normal rate  [Normal S1, S2] : normal S1 and S2 [Soft] : abdomen soft [Non Tender] : non-tender [Coordination Grossly Intact] : coordination grossly intact [Normal Gait] : normal gait [Speech Grossly Normal] : speech grossly normal [Normal Mood] : the mood was normal

## 2023-02-16 LAB
ALBUMIN SERPL ELPH-MCNC: 4.8 G/DL
ALP BLD-CCNC: 89 U/L
ALT SERPL-CCNC: 23 U/L
ANION GAP SERPL CALC-SCNC: 12 MMOL/L
APPEARANCE: CLEAR
APTT BLD: 30.3 SEC
AST SERPL-CCNC: 22 U/L
BASOPHILS # BLD AUTO: 0.03 K/UL
BASOPHILS NFR BLD AUTO: 0.5 %
BILIRUB SERPL-MCNC: 0.4 MG/DL
BILIRUBIN URINE: NEGATIVE
BLOOD URINE: NEGATIVE
BUN SERPL-MCNC: 14 MG/DL
CALCIUM SERPL-MCNC: 10.2 MG/DL
CHLORIDE SERPL-SCNC: 95 MMOL/L
CO2 SERPL-SCNC: 28 MMOL/L
COLOR: COLORLESS
CREAT SERPL-MCNC: 0.81 MG/DL
EGFR: 105 ML/MIN/1.73M2
EOSINOPHIL # BLD AUTO: 0.07 K/UL
EOSINOPHIL NFR BLD AUTO: 1.1 %
GLUCOSE QUALITATIVE U: NEGATIVE
GLUCOSE SERPL-MCNC: 89 MG/DL
HCT VFR BLD CALC: 42.8 %
HGB BLD-MCNC: 14.8 G/DL
IMM GRANULOCYTES NFR BLD AUTO: 0.5 %
INR PPP: 1.03 RATIO
KETONES URINE: NEGATIVE
LEUKOCYTE ESTERASE URINE: NEGATIVE
LYMPHOCYTES # BLD AUTO: 1.88 K/UL
LYMPHOCYTES NFR BLD AUTO: 30.3 %
MAN DIFF?: NORMAL
MCHC RBC-ENTMCNC: 31.2 PG
MCHC RBC-ENTMCNC: 34.6 GM/DL
MCV RBC AUTO: 90.3 FL
MONOCYTES # BLD AUTO: 0.53 K/UL
MONOCYTES NFR BLD AUTO: 8.5 %
NEUTROPHILS # BLD AUTO: 3.67 K/UL
NEUTROPHILS NFR BLD AUTO: 59.1 %
NITRITE URINE: NEGATIVE
PH URINE: 6.5
PLATELET # BLD AUTO: 302 K/UL
POTASSIUM SERPL-SCNC: 4.2 MMOL/L
PROT SERPL-MCNC: 7.5 G/DL
PROTEIN URINE: NEGATIVE
PT BLD: 12.1 SEC
RBC # BLD: 4.74 M/UL
RBC # FLD: 12.7 %
SODIUM SERPL-SCNC: 136 MMOL/L
SPECIFIC GRAVITY URINE: 1
TESTOST SERPL-MCNC: 629 NG/DL
UROBILINOGEN URINE: NORMAL
WBC # FLD AUTO: 6.21 K/UL

## 2023-02-16 NOTE — REVIEW OF SYSTEMS
[Fever] : no fever [Night Sweats] : no night sweats [Discharge] : no discharge [Vision Problems] : no vision problems [Earache] : no earache [Nasal Discharge] : no nasal discharge [Chest Pain] : no chest pain [Palpitations] : no palpitations [Shortness Of Breath] : no shortness of breath [Cough] : no cough [Nausea] : no nausea [Vomiting] : no vomiting [Headache] : no headache [Memory Loss] : no memory loss [Easy Bleeding] : no easy bleeding [Easy Bruising] : no easy bruising

## 2023-02-16 NOTE — HISTORY OF PRESENT ILLNESS
[No Pertinent Cardiac History] : no history of aortic stenosis, atrial fibrillation, coronary artery disease, recent myocardial infarction, or implantable device/pacemaker [No Pertinent Pulmonary History] : no history of asthma, COPD, sleep apnea, or smoking [No Adverse Anesthesia Reaction] : no adverse anesthesia reaction in self or family member [(Patient denies any chest pain, claudication, dyspnea on exertion, orthopnea, palpitations or syncope)] : Patient denies any chest pain, claudication, dyspnea on exertion, orthopnea, palpitations or syncope [Good (7-10 METs)] : Good (7-10 METs) [Chronic Anticoagulation] : no chronic anticoagulation [Chronic Kidney Disease] : no chronic kidney disease [Diabetes] : no diabetes [FreeTextEntry1] : R knee meniscus [FreeTextEntry2] : 2/24/23 [FreeTextEntry4] : No nausea, vomiting, diarrhea, and constipation. No chest pain or shortness of breath.\par

## 2023-02-16 NOTE — ADDENDUM
[FreeTextEntry1] : Labs are within acceptable limits.  EKG is normal sinus rhythm with no acute ST-T wave changes.\par \par Patient is medically optimized and medically cleared for surgery and general anesthesia.

## 2023-02-24 ENCOUNTER — APPOINTMENT (OUTPATIENT)
Dept: ORTHOPEDIC SURGERY | Facility: HOSPITAL | Age: 53
End: 2023-02-24
Payer: COMMERCIAL

## 2023-02-24 PROCEDURE — 29875 ARTHRS KNEE SURG SYNVCT LMTD: CPT | Mod: RT,59

## 2023-02-24 PROCEDURE — 29881 ARTHRS KNE SRG MNISECTMY M/L: CPT | Mod: RT

## 2023-02-28 ENCOUNTER — NON-APPOINTMENT (OUTPATIENT)
Age: 53
End: 2023-02-28

## 2023-02-28 ENCOUNTER — APPOINTMENT (OUTPATIENT)
Dept: ORTHOPEDIC SURGERY | Facility: CLINIC | Age: 53
End: 2023-02-28
Payer: COMMERCIAL

## 2023-02-28 DIAGNOSIS — S83.231A COMPLEX TEAR OF MEDIAL MENISCUS, CURRENT INJURY, RIGHT KNEE, INITIAL ENCOUNTER: ICD-10-CM

## 2023-02-28 LAB — BACTERIA UR CULT: NORMAL

## 2023-02-28 PROCEDURE — 99024 POSTOP FOLLOW-UP VISIT: CPT

## 2023-02-28 NOTE — HISTORY OF PRESENT ILLNESS
[de-identified] : s/p RIGHT knee arthroscopy with partial medial menisectomy, scherer syn, MFC/PFC [de-identified] : Patient is 4 days postop.  States overall improving.  States mild soreness and no need for pain medication currently [de-identified] : RIGHT Knee:  There is a mild effusion c/w expectation.  WOunds are CDI with sutures- removed and steristripped.  ROM 0 - 125.  No TTP to medial or lateral joint line. [de-identified] : s/p RIGHT knee arthroscopy with partial medial menisectomy, scherer syn, MFC/PFC [de-identified] : Discussed at length the patient surgery underlying arthritis and treatment options at this time patient lacks home exercises and physical therapy he'll followup in 3 weeks as needed\par \par \par

## 2023-07-26 ENCOUNTER — APPOINTMENT (OUTPATIENT)
Dept: INTERNAL MEDICINE | Facility: CLINIC | Age: 53
End: 2023-07-26

## 2023-08-15 ENCOUNTER — APPOINTMENT (OUTPATIENT)
Dept: ORTHOPEDIC SURGERY | Facility: CLINIC | Age: 53
End: 2023-08-15
Payer: COMMERCIAL

## 2023-08-15 DIAGNOSIS — M17.11 UNILATERAL PRIMARY OSTEOARTHRITIS, RIGHT KNEE: ICD-10-CM

## 2023-08-15 PROCEDURE — 20610 DRAIN/INJ JOINT/BURSA W/O US: CPT | Mod: RT

## 2023-08-15 PROCEDURE — 99213 OFFICE O/P EST LOW 20 MIN: CPT | Mod: 25

## 2023-08-15 NOTE — HISTORY OF PRESENT ILLNESS
[de-identified] : s/p RIGHT knee arthroscopy with partial medial menisectomy, scherer syn, MFC/PFC [de-identified] : Patient 6 months postop.  States overall improved and at 3 months postop was doing well.  States mild soreness and swelling over past few weeks particuarly with hiking [de-identified] : RIGHT knee  Constitutional:  The patient is healthy-appearing and in no apparent distress.   Gait: The patient ambulates with a normal gait and no limp.  Cardiovascular System:  The capillary refill is less than 2 seconds.   Skin:  There are no skin abnormalities.  Bony Palpation:  There is no tenderness of the medial joint line.  There is no tenderness of the lateral joint line. There is no tenderness of the medial femoral chondyle. There is no tenderness of the lateral femoral chondyle. There is no tenderness of the tibial tubercle. There is no tenderness of the superior patella. There is no tenderness of the inferior patella. There is tenderness of the medial patellar facet. There is tenderness of the lateral patellar facet.  Soft Tissue Palpation:  There is no tenderness of the medial retinaculum. There is no tenderness of the lateral retinaculum. There is no tenderness of the quadriceps tendon. There is no tenderness of the patella tendon. There is no tenderness of the ITB. There is no tenderness of the pes anserine.  Active Range of Motion:  The range of motion at the knee actively and passively is full.   Special Tests:  There is a negative Apley. There is a negative Steinmanns.  There is a negative Lachman and Anterior Drawer. There is a negative Posterior Drawer.   There is no varus or valgus laxity.  Strength:  There is 4/5 hip flexion and 5/5 knee flexion and extension.    Psychiatric:  The patient demonstrates a normal mood and affect and is active and alert.  Alignment: There is external tibial rotation and femoral anteversion. [de-identified] : s/p RIGHT knee arthroscopy with partial medial menisectomy, scherer syn, MFC/PFC [de-identified] : Discussed at length the patient prior surgery underlying arthritis and treatment options.  Patient elects home exercises and cortisone injection at this time.  Patient has demonstrated limited relief from NSAIDS, rest, exercises / PT, and after discussion of the risks and benefits, the patient has elected to proceed with a corticosteroid injection into the RIGHT knee via an Anterolateral site. Confirmed that the patient does not have history of prior adverse reactions, active, infections, or relevant allergies.   There was no erythema or warmth, and the skin was clear.  The skin was sterilized with alcohol and via sterile technique, the knee was injected 3 cc of 1% xylocaine with 5 mg Kenalog.  The injection was completed without complication and a bandage was applied.  The patient tolerated the procedure well and was given post-injection instructions.

## 2023-08-31 ENCOUNTER — APPOINTMENT (OUTPATIENT)
Dept: INTERNAL MEDICINE | Facility: CLINIC | Age: 53
End: 2023-08-31
Payer: COMMERCIAL

## 2023-08-31 VITALS
DIASTOLIC BLOOD PRESSURE: 63 MMHG | SYSTOLIC BLOOD PRESSURE: 125 MMHG | HEART RATE: 62 BPM | TEMPERATURE: 98 F | HEIGHT: 70 IN | BODY MASS INDEX: 24.17 KG/M2 | WEIGHT: 168.8 LBS | OXYGEN SATURATION: 99 %

## 2023-08-31 DIAGNOSIS — Z23 ENCOUNTER FOR IMMUNIZATION: ICD-10-CM

## 2023-08-31 DIAGNOSIS — Z00.00 ENCOUNTER FOR GENERAL ADULT MEDICAL EXAMINATION W/OUT ABNORMAL FINDINGS: ICD-10-CM

## 2023-08-31 DIAGNOSIS — I10 ESSENTIAL (PRIMARY) HYPERTENSION: ICD-10-CM

## 2023-08-31 DIAGNOSIS — R35.1 BENIGN PROSTATIC HYPERPLASIA WITH LOWER URINARY TRACT SYMPMS: ICD-10-CM

## 2023-08-31 DIAGNOSIS — N40.1 BENIGN PROSTATIC HYPERPLASIA WITH LOWER URINARY TRACT SYMPMS: ICD-10-CM

## 2023-08-31 PROCEDURE — 99396 PREV VISIT EST AGE 40-64: CPT | Mod: 25

## 2023-08-31 PROCEDURE — 90632 HEPA VACCINE ADULT IM: CPT

## 2023-08-31 PROCEDURE — 90471 IMMUNIZATION ADMIN: CPT

## 2023-08-31 RX ORDER — OXYCODONE AND ACETAMINOPHEN 5; 325 MG/1; MG/1
5-325 TABLET ORAL
Qty: 30 | Refills: 0 | Status: DISCONTINUED | COMMUNITY
Start: 2023-02-24 | End: 2023-08-31

## 2023-08-31 RX ORDER — SALMONELLA TYPHI TY21A 6000000000 [CFU]/1
CAPSULE, COATED ORAL
Qty: 1 | Refills: 0 | Status: ACTIVE | COMMUNITY
Start: 2023-08-31 | End: 1900-01-01

## 2023-08-31 RX ORDER — CIPROFLOXACIN HYDROCHLORIDE 500 MG/1
500 TABLET, FILM COATED ORAL
Qty: 14 | Refills: 0 | Status: ACTIVE | COMMUNITY
Start: 2023-08-31 | End: 1900-01-01

## 2023-08-31 NOTE — HISTORY OF PRESENT ILLNESS
[de-identified] : No nausea, vomiting, diarrhea, and constipation. No chest pain or shortness of breath. R knee swelling on and off since knee Sx in Feb. Patient is traveling to [place] and needs traveling immunizations and advice.

## 2023-08-31 NOTE — HEALTH RISK ASSESSMENT
[Very Good] : ~his/her~  mood as very good [0] : 2) Feeling down, depressed, or hopeless: Not at all (0) [PHQ-2 Negative - No further assessment needed] : PHQ-2 Negative - No further assessment needed [None] : None [Alone] : lives alone [Never] : Never [[id number]] : 0

## 2023-09-08 DIAGNOSIS — N52.9 MALE ERECTILE DYSFUNCTION, UNSPECIFIED: ICD-10-CM

## 2023-09-08 LAB
ALBUMIN SERPL ELPH-MCNC: 4.8 G/DL
ALP BLD-CCNC: 85 U/L
ALT SERPL-CCNC: 19 U/L
ANION GAP SERPL CALC-SCNC: 13 MMOL/L
AST SERPL-CCNC: 20 U/L
BASOPHILS # BLD AUTO: 0.02 K/UL
BASOPHILS NFR BLD AUTO: 0.6 %
BILIRUB SERPL-MCNC: 0.6 MG/DL
BUN SERPL-MCNC: 11 MG/DL
CALCIUM SERPL-MCNC: 9.8 MG/DL
CHLORIDE SERPL-SCNC: 97 MMOL/L
CHOLEST SERPL-MCNC: 216 MG/DL
CO2 SERPL-SCNC: 25 MMOL/L
CREAT SERPL-MCNC: 0.75 MG/DL
EGFR: 107 ML/MIN/1.73M2
EOSINOPHIL # BLD AUTO: 0.03 K/UL
EOSINOPHIL NFR BLD AUTO: 0.9 %
GLUCOSE SERPL-MCNC: 95 MG/DL
HCT VFR BLD CALC: 45.9 %
HDLC SERPL-MCNC: 80 MG/DL
HGB BLD-MCNC: 15 G/DL
IMM GRANULOCYTES NFR BLD AUTO: 0 %
LDLC SERPL CALC-MCNC: 128 MG/DL
LYMPHOCYTES # BLD AUTO: 1.24 K/UL
LYMPHOCYTES NFR BLD AUTO: 36.9 %
MAN DIFF?: NORMAL
MCHC RBC-ENTMCNC: 30.5 PG
MCHC RBC-ENTMCNC: 32.7 GM/DL
MCV RBC AUTO: 93.5 FL
MONOCYTES # BLD AUTO: 0.4 K/UL
MONOCYTES NFR BLD AUTO: 11.9 %
NEUTROPHILS # BLD AUTO: 1.67 K/UL
NEUTROPHILS NFR BLD AUTO: 49.7 %
NONHDLC SERPL-MCNC: 136 MG/DL
PLATELET # BLD AUTO: 316 K/UL
POTASSIUM SERPL-SCNC: 4.5 MMOL/L
PROT SERPL-MCNC: 7.4 G/DL
PSA SERPL-MCNC: 0.35 NG/ML
RBC # BLD: 4.91 M/UL
RBC # FLD: 12.9 %
SODIUM SERPL-SCNC: 136 MMOL/L
TRIGL SERPL-MCNC: 44 MG/DL
TSH SERPL-ACNC: 1.4 UIU/ML
WBC # FLD AUTO: 3.36 K/UL

## 2023-09-08 RX ORDER — SILDENAFIL 100 MG/1
100 TABLET, FILM COATED ORAL DAILY
Qty: 5 | Refills: 5 | Status: ACTIVE | COMMUNITY
Start: 2023-09-08 | End: 1900-01-01

## 2023-11-06 ENCOUNTER — RX RENEWAL (OUTPATIENT)
Age: 53
End: 2023-11-06

## 2023-11-06 RX ORDER — AMLODIPINE BESYLATE 5 MG/1
5 TABLET ORAL DAILY
Qty: 90 | Refills: 3 | Status: ACTIVE | COMMUNITY
Start: 2017-06-12 | End: 1900-01-01

## 2024-01-17 RX ORDER — AMLODIPINE BESYLATE 10 MG/1
10 TABLET ORAL DAILY
Qty: 90 | Refills: 3 | Status: ACTIVE | COMMUNITY
Start: 2021-02-02 | End: 1900-01-01

## 2024-09-25 ENCOUNTER — NON-APPOINTMENT (OUTPATIENT)
Age: 54
End: 2024-09-25

## 2024-09-26 ENCOUNTER — APPOINTMENT (OUTPATIENT)
Dept: INTERNAL MEDICINE | Facility: CLINIC | Age: 54
End: 2024-09-26
Payer: COMMERCIAL

## 2024-09-26 VITALS
BODY MASS INDEX: 24.48 KG/M2 | SYSTOLIC BLOOD PRESSURE: 130 MMHG | WEIGHT: 171 LBS | DIASTOLIC BLOOD PRESSURE: 84 MMHG | HEART RATE: 60 BPM | OXYGEN SATURATION: 97 % | HEIGHT: 70 IN | TEMPERATURE: 96.8 F | RESPIRATION RATE: 16 BRPM

## 2024-09-26 DIAGNOSIS — M23.90 UNSPECIFIED INTERNAL DERANGEMENT OF UNSPECIFIED KNEE: ICD-10-CM

## 2024-09-26 DIAGNOSIS — Z23 ENCOUNTER FOR IMMUNIZATION: ICD-10-CM

## 2024-09-26 DIAGNOSIS — N40.1 BENIGN PROSTATIC HYPERPLASIA WITH LOWER URINARY TRACT SYMPMS: ICD-10-CM

## 2024-09-26 DIAGNOSIS — R53.81 OTHER MALAISE: ICD-10-CM

## 2024-09-26 DIAGNOSIS — Z11.59 ENCOUNTER FOR SCREENING FOR OTHER VIRAL DISEASES: ICD-10-CM

## 2024-09-26 DIAGNOSIS — Z87.39 PERSONAL HISTORY OF OTHER DISEASES OF THE MUSCULOSKELETAL SYSTEM AND CONNECTIVE TISSUE: ICD-10-CM

## 2024-09-26 DIAGNOSIS — M25.569 PAIN IN UNSPECIFIED KNEE: ICD-10-CM

## 2024-09-26 DIAGNOSIS — R35.1 BENIGN PROSTATIC HYPERPLASIA WITH LOWER URINARY TRACT SYMPMS: ICD-10-CM

## 2024-09-26 DIAGNOSIS — Z87.898 PERSONAL HISTORY OF OTHER SPECIFIED CONDITIONS: ICD-10-CM

## 2024-09-26 DIAGNOSIS — R53.83 OTHER MALAISE: ICD-10-CM

## 2024-09-26 DIAGNOSIS — Z01.818 ENCOUNTER FOR OTHER PREPROCEDURAL EXAMINATION: ICD-10-CM

## 2024-09-26 DIAGNOSIS — Z00.00 ENCOUNTER FOR GENERAL ADULT MEDICAL EXAMINATION W/OUT ABNORMAL FINDINGS: ICD-10-CM

## 2024-09-26 PROCEDURE — 99396 PREV VISIT EST AGE 40-64: CPT | Mod: 25

## 2024-09-26 PROCEDURE — 90656 IIV3 VACC NO PRSV 0.5 ML IM: CPT

## 2024-09-26 PROCEDURE — G0008: CPT

## 2024-09-26 NOTE — HEALTH RISK ASSESSMENT
[Very Good] : ~his/her~  mood as very good [0] : 2) Feeling down, depressed, or hopeless: Not at all (0) [PHQ-2 Negative - No further assessment needed] : PHQ-2 Negative - No further assessment needed [Never] : Never [NO] : No [None] : None [With Significant Other] : lives with significant other [Employed] : employed [Significant Other] : lives with significant other [[id number]] : 0

## 2024-09-26 NOTE — PHYSICAL EXAM
[No Acute Distress] : no acute distress [Well Nourished] : well nourished [EOMI] : extraocular movements intact [Normal TMs] : both tympanic membranes were normal [Supple] : supple [Clear to Auscultation] : lungs were clear to auscultation bilaterally [Regular Rhythm] : with a regular rhythm [Soft] : abdomen soft [Coordination Grossly Intact] : coordination grossly intact [Normal Gait] : normal gait [Speech Grossly Normal] : speech grossly normal [Normal Mood] : the mood was normal

## 2024-09-26 NOTE — REVIEW OF SYSTEMS
[Fever] : no fever [Night Sweats] : no night sweats [Sore Throat] : no sore throat [Palpitations] : no palpitations [Cough] : no cough [Nausea] : no nausea [Joint Pain] : no joint pain [Back Pain] : no back pain [Itching] : no itching [Skin Rash] : no skin rash

## 2024-09-26 NOTE — HISTORY OF PRESENT ILLNESS
[de-identified] : No nausea, vomiting, diarrhea, and constipation. No chest pain or shortness of breath. Needs derm.  Needs colo 7 years which is 6/2025

## 2024-10-03 DIAGNOSIS — I10 ESSENTIAL (PRIMARY) HYPERTENSION: ICD-10-CM

## 2024-10-03 LAB
ALBUMIN SERPL ELPH-MCNC: 4.5 G/DL
ALP BLD-CCNC: 87 U/L
ALT SERPL-CCNC: 18 U/L
ANION GAP SERPL CALC-SCNC: 14 MMOL/L
AST SERPL-CCNC: 21 U/L
BASOPHILS # BLD AUTO: 0.03 K/UL
BASOPHILS NFR BLD AUTO: 0.5 %
BILIRUB SERPL-MCNC: 0.5 MG/DL
BUN SERPL-MCNC: 11 MG/DL
CALCIUM SERPL-MCNC: 9.3 MG/DL
CHLORIDE SERPL-SCNC: 95 MMOL/L
CO2 SERPL-SCNC: 24 MMOL/L
CREAT SERPL-MCNC: 0.73 MG/DL
EGFR: 107 ML/MIN/1.73M2
EOSINOPHIL # BLD AUTO: 0.03 K/UL
EOSINOPHIL NFR BLD AUTO: 0.5 %
GLUCOSE SERPL-MCNC: 88 MG/DL
HCT VFR BLD CALC: 43.2 %
HGB BLD-MCNC: 14.3 G/DL
IMM GRANULOCYTES NFR BLD AUTO: 0.2 %
LPL SERPL-CCNC: 23 U/L
LYMPHOCYTES # BLD AUTO: 1.66 K/UL
LYMPHOCYTES NFR BLD AUTO: 28.7 %
MAN DIFF?: NORMAL
MCHC RBC-ENTMCNC: 30.9 PG
MCHC RBC-ENTMCNC: 33.1 GM/DL
MCV RBC AUTO: 93.3 FL
MONOCYTES # BLD AUTO: 0.39 K/UL
MONOCYTES NFR BLD AUTO: 6.7 %
NEUTROPHILS # BLD AUTO: 3.67 K/UL
NEUTROPHILS NFR BLD AUTO: 63.4 %
PLATELET # BLD AUTO: 296 K/UL
POTASSIUM SERPL-SCNC: 4.2 MMOL/L
PROT SERPL-MCNC: 7 G/DL
PSA SERPL-MCNC: 0.44 NG/ML
RBC # BLD: 4.63 M/UL
RBC # FLD: 12.7 %
SODIUM SERPL-SCNC: 133 MMOL/L
TSH SERPL-ACNC: 1.27 UIU/ML
WBC # FLD AUTO: 5.79 K/UL

## 2024-10-07 ENCOUNTER — APPOINTMENT (OUTPATIENT)
Dept: INTERNAL MEDICINE | Facility: CLINIC | Age: 54
End: 2024-10-07

## 2024-11-01 ENCOUNTER — RX RENEWAL (OUTPATIENT)
Age: 54
End: 2024-11-01